# Patient Record
Sex: FEMALE | Race: BLACK OR AFRICAN AMERICAN | NOT HISPANIC OR LATINO | Employment: FULL TIME | URBAN - METROPOLITAN AREA
[De-identification: names, ages, dates, MRNs, and addresses within clinical notes are randomized per-mention and may not be internally consistent; named-entity substitution may affect disease eponyms.]

---

## 2017-09-26 ENCOUNTER — OFFICE VISIT (OUTPATIENT)
Dept: OBSTETRICS AND GYNECOLOGY | Age: 32
End: 2017-09-26

## 2017-09-26 VITALS
HEIGHT: 64 IN | DIASTOLIC BLOOD PRESSURE: 70 MMHG | SYSTOLIC BLOOD PRESSURE: 112 MMHG | BODY MASS INDEX: 23.39 KG/M2 | WEIGHT: 137 LBS

## 2017-09-26 DIAGNOSIS — Z11.51 SCREENING FOR HUMAN PAPILLOMAVIRUS: ICD-10-CM

## 2017-09-26 DIAGNOSIS — Z12.4 ROUTINE CERVICAL SMEAR: Primary | ICD-10-CM

## 2017-09-26 PROBLEM — Z97.5 NEXPLANON IN PLACE: Status: ACTIVE | Noted: 2017-09-26

## 2017-09-26 PROCEDURE — 99395 PREV VISIT EST AGE 18-39: CPT | Performed by: OBSTETRICS & GYNECOLOGY

## 2017-09-26 NOTE — PROGRESS NOTES
Subjective     Chief Complaint   Patient presents with   • Gynecologic Exam     AC       History of Present Illness    Opal Oh is a 32 y.o.  who presents for annual exam. 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  She plans to work for [employer].  She did her medical school training in [place].  She has a daughter who is 2 years old.  Unfortunately she is in the process of getting a divorce.   The patient has a history of open myomectomy due to 2 fibroids one measured 5 cm the other measured 3 cm this was done prior to her delivery.   The patient has the implant.  She would like to change over to an IUD prior to starting her job.  She would like to do this in October.  Her menses are irregular, lasting 0-3 days, dysmenorrhea none   Obstetric History:  OB History      Para Term  AB Living    1 1  1  1    SAB TAB Ectopic Multiple Live Births        1         Menstrual History:     No LMP recorded. Patient has had an implant.         Current contraception: abstinence and Nexplanon   History of abnormal Pap smear: no  Received Gardasil immunization: had one  Perform regular self breast exam: yes  Family history of uterine or ovarian cancer: no  Family History of colon cancer: no  Family history of breast cancer: yes - 3 paternal realatives    Mammogram: not indicated.  Colonoscopy: not indicated.  DEXA: not indicated.    Exercise: not active  Calcium/Vitamin D: adequate intake    The following portions of the patient's history were reviewed and updated as appropriate: allergies, current medications, past family history, past medical history, past social history, past surgical history and problem list.    Review of Systems    Review of Systems   Constitutional: Negative for fatigue.   Respiratory: Negative for shortness of breath.    Gastrointestinal: Negative for abdominal pain.   Genitourinary: Negative for dysuria.   Neurological: Negative for headaches.  "  Psychiatric/Behavioral: Negative for dysphoric mood.         Objective   Physical Exam    /70  Ht 64\" (162.6 cm)  Wt 137 lb (62.1 kg)  BMI 23.52 kg/m2    General:   alert, appears stated age and cooperative   Neck: no asymmetry, masses, or scars and thyroid normal to palpation   Heart: regular rate and rhythm   Lungs: clear to auscultation bilaterally   Abdomen: soft, non-tender, without masses or organomegaly   Breast: inspection negative, no nipple discharge or bleeding, no masses or nodularity palpable   Vulva: normal, Bartholin's, Urethra, [place]'s normal   Vagina: normal mucosa, normal discharge   Cervix: no cervical motion tenderness and Ears a small clear cystic area on the outer part of the cervix at about 10:00.  It is grasped with a hemostat but does not rupture.  It appears clear and benign.   Uterus: mobile, non-tender, normal shape and consistency   Adnexa: no mass, fullness, tenderness   Rectal: not indicated     Assessment/Plan   Opal was seen today for gynecologic exam.    Diagnoses and all orders for this visit:    Routine cervical smear  -     IGP, Aptima HPV, Rfx 16 / 18,45    Screening for human papillomavirus  -     IGP, Aptima HPV, Rfx 16 / 18,45    We had a long discussion about IUDs.  Patient would like the Kyleena.  Discussed risk and benefits.  I think this will inhibit regrowth of any small fibroids.    She is at increased risk for breast cancer due to her family history.  We will send off the my risk test today.  We also discussed that she may be a candidate for annual MRI.  All questions answered.  Breast self exam technique reviewed and patient encouraged to perform self-exam monthly.  Discussed healthy lifestyle modifications.  Recommended 30 minutes of aerobic exercise five times per week.  Discussed calcium needs to prevent osteoporosis.                 "

## 2017-09-29 LAB
CYTOLOGIST CVX/VAG CYTO: NORMAL
CYTOLOGY CVX/VAG DOC THIN PREP: NORMAL
DX ICD CODE: NORMAL
HIV 1 & 2 AB SER-IMP: NORMAL
HPV I/H RISK 4 DNA CVX QL PROBE+SIG AMP: NEGATIVE
OTHER STN SPEC: NORMAL
PATH REPORT.FINAL DX SPEC: NORMAL
STAT OF ADQ CVX/VAG CYTO-IMP: NORMAL

## 2017-10-02 ENCOUNTER — TELEPHONE (OUTPATIENT)
Dept: OBSTETRICS AND GYNECOLOGY | Age: 32
End: 2017-10-02

## 2017-10-06 ENCOUNTER — OFFICE VISIT (OUTPATIENT)
Dept: OBSTETRICS AND GYNECOLOGY | Age: 32
End: 2017-10-06

## 2017-10-06 ENCOUNTER — PROCEDURE VISIT (OUTPATIENT)
Dept: OBSTETRICS AND GYNECOLOGY | Age: 32
End: 2017-10-06

## 2017-10-06 VITALS
DIASTOLIC BLOOD PRESSURE: 60 MMHG | WEIGHT: 137 LBS | HEIGHT: 64 IN | SYSTOLIC BLOOD PRESSURE: 110 MMHG | BODY MASS INDEX: 23.39 KG/M2

## 2017-10-06 DIAGNOSIS — N83.202 OVARIAN CYST, LEFT: ICD-10-CM

## 2017-10-06 DIAGNOSIS — Z30.46 NEXPLANON REMOVAL: ICD-10-CM

## 2017-10-06 DIAGNOSIS — Z30.431 IUD CHECK UP: Primary | ICD-10-CM

## 2017-10-06 DIAGNOSIS — Z30.430 ENCOUNTER FOR IUD INSERTION: Primary | ICD-10-CM

## 2017-10-06 LAB
B-HCG UR QL: NEGATIVE
INTERNAL NEGATIVE CONTROL: NEGATIVE
INTERNAL POSITIVE CONTROL: POSITIVE
Lab: NORMAL

## 2017-10-06 PROCEDURE — 58300 INSERT INTRAUTERINE DEVICE: CPT | Performed by: PHYSICIAN ASSISTANT

## 2017-10-06 PROCEDURE — 81025 URINE PREGNANCY TEST: CPT | Performed by: PHYSICIAN ASSISTANT

## 2017-10-06 PROCEDURE — 11982 REMOVE DRUG IMPLANT DEVICE: CPT | Performed by: PHYSICIAN ASSISTANT

## 2017-10-06 PROCEDURE — 76830 TRANSVAGINAL US NON-OB: CPT | Performed by: PHYSICIAN ASSISTANT

## 2017-10-06 NOTE — PROGRESS NOTES
Nexplanon Removal Procedure Note    Pre-operative Diagnosis: Alternate contraception    Post-operative Diagnosis: same    Indications: contraception    Procedure Details   The risks (including infection, bruising, irregular bleeding, pain at removal site, and injury to muscles, nerves and blood vessels) and benefits of the procedure were explained to the patient and/or guardian and written informed consent was obtained.      Nexplanon device was easily located by palpation of inner arm.  The device insertion site was painted with betadine and allowed to dry.  Device site anesthetized with 3 ml 2% lidocaine with epi.  End of device was located and 11 blade was used to make small incision.  Device was located in subcutaneou tissue, grasped with hemostat and removed intact. Incision site was closed with steri-strips, and band aid.   Pressure dressing applied.  There were no complications.        Condition:  Stable    Complications:  None    Plan:    The patient was advised to call for any rash, arm pain, fever, warmth or for prolonged bruising or bleeding. She was advised to use OTC acetaminophen as needed for mild to moderate pain. IUD Insertion           IUD Insertion    No LMP recorded. Patient has had an implant.    Date of procedure:  10/6/2017    Risks and benefits discussed? yes  All questions answered? yes  Consents given by The patient  Written consent obtained? yes    Procedure documentation:     Urine pregnancy test was done today and result was negative.  The risks (including infection, bleeding, pain, and uterine perforation) and benefits of the procedure were explained to the patient and Written informed consent was obtained.    After verifying the patient had a low probability of being pregnant and met the criteria for insertion, a sterile speculum has placed and the cervix was cleansed with an antiseptic solution.  Vaginal discharge was scant.  The anterior lip of the cervix was grasped with an eddie   and the uterine cavity was gently sounded. There was no difficulty passing the sound through the cervix.  Cervical dilation did not need to be performed prior to placing the IUD.  The uterus was anteverted and sounded to 8 cms.  The Kyleena was then prepared per the manufacturers instructions.    Of note, she does have what appears to be a cyst at the 9 oclock position near her cervix that pushes it to the left.  She states Dr Godwin is aware of this.    The Kyleena was advanced to a point 2 cms from the fundus and then the arms were released from the sheath.  The device was advanced to the fundus and the device was released fully from the sheath.. The string was cut 2 cms in length.  Bleeding from the cervix was scant.    She tolerated the procedure without any difficulty.    Post procedure instructions: The patient was advised to call for any fever or for prolonged or severe pain or bleeding..    Follow up needed: 6 weeks for IUD check    US done and IUD within endometrium.  Left follicle noted that measures 4.3 x 2.4 cm

## 2017-10-20 ENCOUNTER — OFFICE VISIT (OUTPATIENT)
Dept: OBSTETRICS AND GYNECOLOGY | Age: 32
End: 2017-10-20

## 2017-10-20 VITALS
SYSTOLIC BLOOD PRESSURE: 120 MMHG | WEIGHT: 136 LBS | DIASTOLIC BLOOD PRESSURE: 70 MMHG | HEIGHT: 64 IN | BODY MASS INDEX: 23.22 KG/M2

## 2017-10-20 DIAGNOSIS — Z15.09 MONOALLELIC MUTATION OF PALB2 GENE: Primary | ICD-10-CM

## 2017-10-20 DIAGNOSIS — Z15.89 MONOALLELIC MUTATION OF PALB2 GENE: Primary | ICD-10-CM

## 2017-10-20 DIAGNOSIS — Z15.01 MONOALLELIC MUTATION OF PALB2 GENE: Primary | ICD-10-CM

## 2017-10-20 PROBLEM — Z97.5 NEXPLANON IN PLACE: Status: RESOLVED | Noted: 2017-09-26 | Resolved: 2017-10-20

## 2017-10-20 PROBLEM — Z97.5 IUD (INTRAUTERINE DEVICE) IN PLACE: Status: ACTIVE | Noted: 2017-10-20

## 2017-10-20 PROCEDURE — 99215 OFFICE O/P EST HI 40 MIN: CPT | Performed by: OBSTETRICS & GYNECOLOGY

## 2017-10-20 NOTE — PROGRESS NOTES
"  Chief complaint-called in due to positive results for a cancer mutation    History of present illness- Patient is a 32 y.o.  who was tested for genetic mutations due to strong family history of breast cancer in paternal grandmother, paternal aunt and paternal great uncle.  Cancers occurred in their 40s to 50s.  Paternal aunt was BRCA indeterminant.  The patient is unsure if the aunt was tested for PALB 2.  She is positive for heterozygous mutation of the PALB 2 gene.  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  She recently had the kyleena IUD placed last month.  The patient has a 2-year-old daughter.  She is currently in the process of getting a divorce.  She has 1 sibling a brother and her father have never been tested for genetic abnormalities.         /70  Ht 64\" (162.6 cm)  Wt 136 lb (61.7 kg)  BMI 23.34 kg/m2  OBGyn Exam    Constitutional- patient is tearful at the news. She is very appropriate and asked very appropriate questions.    Opal was seen today for follow-up.    Diagnoses and all orders for this visit:    Monoallelic mutation of PALB2 gene  -     Mammo screening digital tomosynthesis bilateral w CAD; Future  -     Ambulatory Referral to Genetics  -     MRI Breast Bilateral With & Without Contrast; Future      We discussed pelvic to in detail.  The pelvis to mutation increases her risk of breast cancer to age 72 between 17-58%.  This significantly elevated over the general population risk of about 11%.  Her risk to age 50 is 14%.  We discussed risk management strategies including breast self exam, clinical breast exam, mammogram, MRI, risk reducing surgeries and risk reducing medications.    The patient is also at increased risk for pancreatic cancer and male breast cancer in the family.    We reviewed all the myriad information in detail and patient was given a full packet of information.    The patient will be referred to genetic counseling.  She will have " her father and brother tested.  [place].    At this point the patient would like to proceed with mammogram and MRI alternated every 6 months.  A 3-D mammogram will be ordered for next week and an MRI in 6 months.  She will return for clinical breast exam in 6 months.  We did discuss her IUD.  At this point was starting a new job next week she would like to retain the IUD likely for this year.  We did discuss that progesterone secretion by the IUD could influence breast cancer risk.  She will likely have it removed at some point but she just does not want to proceed with that today.  She is not ready to have risk reducing surgery at this point but will still consider that.  I also offered referral to GI.  Patient will wait on that for now.  She will return to see me in 6 months.    Time spent counseling was 40 minutes with 90% of that spent face-to-face with the patient.    Xu Godwin MD

## 2017-11-29 ENCOUNTER — OFFICE VISIT (OUTPATIENT)
Dept: OBSTETRICS AND GYNECOLOGY | Age: 32
End: 2017-11-29

## 2017-11-29 ENCOUNTER — PROCEDURE VISIT (OUTPATIENT)
Dept: OBSTETRICS AND GYNECOLOGY | Age: 32
End: 2017-11-29

## 2017-11-29 VITALS
BODY MASS INDEX: 22.71 KG/M2 | WEIGHT: 133 LBS | SYSTOLIC BLOOD PRESSURE: 122 MMHG | DIASTOLIC BLOOD PRESSURE: 78 MMHG | HEIGHT: 64 IN

## 2017-11-29 DIAGNOSIS — Z30.431 IUD CHECK UP: Primary | ICD-10-CM

## 2017-11-29 DIAGNOSIS — N83.201 CYST OF RIGHT OVARY: Primary | ICD-10-CM

## 2017-11-29 PROCEDURE — 99212 OFFICE O/P EST SF 10 MIN: CPT | Performed by: PHYSICIAN ASSISTANT

## 2017-11-29 PROCEDURE — 76830 TRANSVAGINAL US NON-OB: CPT | Performed by: PHYSICIAN ASSISTANT

## 2017-11-29 NOTE — PROGRESS NOTES
"Subjective     Chief Complaint   Patient presents with   • Follow-up     iud follow up check       Opal Oh is a 32 y.o.  whose LMP is No LMP recorded. Patient has had an implant. presents for her IUD check. She has been having spotting, but no pain. Denies painful intercourse. Not checking strings but knows she can.  Had a 4 cm follicle noted on left ovary when u/s done to confirm placement of IUD.  Here for recheck on that. Denies pain/pressure    She is a pt of Dr Godwin    No Additional Complaints Reported    The following portions of the patient's history were reviewed and updated as appropriate:vital signs, allergies, current medications, past family history, past medical history, past social history, past surgical history and problem list      Review of Systems   A comprehensive review of systems was negative except for: iud check     Objective      /78  Ht 64\" (162.6 cm)  Wt 133 lb (60.3 kg)  LMP Comment: spotting  Breastfeeding? No  BMI 22.83 kg/m2    Physical Exam    General:   alert, comfortable and no distress   Heart: Not performed today   Lungs: Not performed today.   Breast: Not performed today   Neck: na   Abdomen: {Not performed today   CVA: Not performed today   Pelvis: Cervix: IUD string visualized and cyst noted, stable at the 9 o'clock position   Extremities: Not performed today   Neurologic: negative   Psychiatric: Normal affect, judgement, and mood       Lab Review   Labs: No data reviewed     Imaging   Ultrasound - Pelvic Vaginal  U/s done and shows left follicle resolved. Small follicle noted on the right that measures 18.0 x 14.0.  IUD in cavity    Assessment/Plan     ASSESSMENT  1. IUD check up        PLAN  1. IUD in place. Pt has no c/o. F/u for annual or prn     Follow up: 1 year(s)    NIRAJ Solomon  2017           "

## 2018-05-16 ENCOUNTER — OFFICE VISIT (OUTPATIENT)
Dept: OBSTETRICS AND GYNECOLOGY | Age: 33
End: 2018-05-16

## 2018-05-16 VITALS
BODY MASS INDEX: 22.53 KG/M2 | HEIGHT: 64 IN | SYSTOLIC BLOOD PRESSURE: 100 MMHG | DIASTOLIC BLOOD PRESSURE: 64 MMHG | WEIGHT: 132 LBS

## 2018-05-16 DIAGNOSIS — Z15.09 MONOALLELIC MUTATION OF PALB2 GENE: ICD-10-CM

## 2018-05-16 DIAGNOSIS — Z15.89 MONOALLELIC MUTATION OF PALB2 GENE: ICD-10-CM

## 2018-05-16 DIAGNOSIS — Z15.01 MONOALLELIC MUTATION OF PALB2 GENE: ICD-10-CM

## 2018-05-16 DIAGNOSIS — Z01.419 ENCOUNTER FOR GYNECOLOGICAL EXAMINATION: Primary | ICD-10-CM

## 2018-05-16 LAB
BILIRUB BLD-MCNC: NEGATIVE MG/DL
CLARITY, POC: CLEAR
COLOR UR: YELLOW
GLUCOSE UR STRIP-MCNC: NEGATIVE MG/DL
KETONES UR QL: NEGATIVE
LEUKOCYTE EST, POC: NEGATIVE
NITRITE UR-MCNC: NEGATIVE MG/ML
PH UR: 5.5 [PH] (ref 5–8)
PROT UR STRIP-MCNC: NEGATIVE MG/DL
RBC # UR STRIP: NEGATIVE /UL
SP GR UR: 1.02 (ref 1–1.03)
UROBILINOGEN UR QL: NORMAL

## 2018-05-16 PROCEDURE — 99212 OFFICE O/P EST SF 10 MIN: CPT | Performed by: OBSTETRICS & GYNECOLOGY

## 2018-05-16 PROCEDURE — 81002 URINALYSIS NONAUTO W/O SCOPE: CPT | Performed by: OBSTETRICS & GYNECOLOGY

## 2018-05-16 NOTE — PROGRESS NOTES
"  Chief complaint-    History of present illness- Patient is a 33 y.o.  who is here for follow-up.  Patient was diagnosed with the PA LB2 genetic mutation which puts her at increased risk for breast and pancreatic cancer.  She was just starting practice as an internist.  Her practice is going well.  She did have 3-dimensional mammogram which was normal in October.  I recommended a breast MRI.  MRI was ordered but patient is not ready to do that.  She is very busy with her daughter and she is taking her course for her boards.  She plans to schedule the MRI in the future.  Her Father was tested and was positive.  Her brother or other relatives have not been tested yet.        /64   Ht 162.6 cm (64.02\")   Wt 59.9 kg (132 lb)   BMI 22.65 kg/m²   Physical Exam   Constitutional: She appears well-developed and well-nourished.   Psychiatric: She has a normal mood and affect. Her behavior is normal. Judgment and thought content normal.           Opal was seen today for gynecologic exam.    Diagnoses and all orders for this visit:    Encounter for gynecological examination  -     POC Urinalysis Dipstick    Pt will follow up for her annual exam in the fall.  She will repeat her 3-D mammogram.  She may get her MRI in the meantime.  She declines referral to genetic counselor or the gastroenterologist.  I encouraged her to have her other relatives tested.  She does want to continue with her IUD for now.  "

## 2018-10-16 DIAGNOSIS — Z92.89 H/O MAMMOGRAM: Primary | ICD-10-CM

## 2019-04-19 ENCOUNTER — OFFICE VISIT (OUTPATIENT)
Dept: OBSTETRICS AND GYNECOLOGY | Age: 34
End: 2019-04-19

## 2019-04-19 ENCOUNTER — PROCEDURE VISIT (OUTPATIENT)
Dept: OBSTETRICS AND GYNECOLOGY | Age: 34
End: 2019-04-19

## 2019-04-19 VITALS
DIASTOLIC BLOOD PRESSURE: 74 MMHG | HEIGHT: 64 IN | SYSTOLIC BLOOD PRESSURE: 108 MMHG | WEIGHT: 145 LBS | BODY MASS INDEX: 24.75 KG/M2

## 2019-04-19 DIAGNOSIS — Z11.51 SPECIAL SCREENING EXAMINATION FOR HUMAN PAPILLOMAVIRUS (HPV): ICD-10-CM

## 2019-04-19 DIAGNOSIS — Z97.5 IUD (INTRAUTERINE DEVICE) IN PLACE: ICD-10-CM

## 2019-04-19 DIAGNOSIS — Z01.419 ENCOUNTER FOR GYNECOLOGICAL EXAMINATION WITHOUT ABNORMAL FINDING: Primary | ICD-10-CM

## 2019-04-19 DIAGNOSIS — Z12.4 ROUTINE CERVICAL SMEAR: ICD-10-CM

## 2019-04-19 DIAGNOSIS — Z15.01 MONOALLELIC MUTATION OF PALB2 GENE: ICD-10-CM

## 2019-04-19 DIAGNOSIS — Z30.431 IUD CHECK UP: Primary | ICD-10-CM

## 2019-04-19 DIAGNOSIS — Z15.89 MONOALLELIC MUTATION OF PALB2 GENE: ICD-10-CM

## 2019-04-19 DIAGNOSIS — Z15.09 MONOALLELIC MUTATION OF PALB2 GENE: ICD-10-CM

## 2019-04-19 PROBLEM — T83.32XA IUD STRINGS LOST: Status: ACTIVE | Noted: 2019-04-19

## 2019-04-19 PROCEDURE — 99395 PREV VISIT EST AGE 18-39: CPT | Performed by: OBSTETRICS & GYNECOLOGY

## 2019-04-19 PROCEDURE — 76830 TRANSVAGINAL US NON-OB: CPT | Performed by: OBSTETRICS & GYNECOLOGY

## 2019-04-19 RX ORDER — MONTELUKAST SODIUM 10 MG/1
10 TABLET ORAL NIGHTLY
COMMUNITY

## 2019-04-19 RX ORDER — ESCITALOPRAM OXALATE 10 MG/1
10 TABLET ORAL DAILY
COMMUNITY
End: 2021-09-24

## 2019-04-19 RX ORDER — AZELASTINE HYDROCHLORIDE, FLUTICASONE PROPIONATE 137; 50 UG/1; UG/1
SPRAY, METERED NASAL
COMMUNITY

## 2019-04-19 RX ORDER — CETIRIZINE HYDROCHLORIDE 10 MG/1
TABLET ORAL
Refills: 11 | COMMUNITY
Start: 2019-04-10

## 2019-04-25 ENCOUNTER — TELEPHONE (OUTPATIENT)
Dept: OBSTETRICS AND GYNECOLOGY | Age: 34
End: 2019-04-25

## 2019-04-25 NOTE — TELEPHONE ENCOUNTER
----- Message from Xu Godwin MD sent at 4/25/2019  8:55 AM EDT -----  Please notify pap is normal.

## 2019-09-18 ENCOUNTER — TELEPHONE (OUTPATIENT)
Dept: OBSTETRICS AND GYNECOLOGY | Age: 34
End: 2019-09-18

## 2019-11-01 ENCOUNTER — TELEPHONE (OUTPATIENT)
Dept: OBSTETRICS AND GYNECOLOGY | Age: 34
End: 2019-11-01

## 2019-11-01 NOTE — TELEPHONE ENCOUNTER
Spoke with pt regarding her breast MRI,  she had a change of insurance and would like an order sent to [facility].

## 2020-02-06 ENCOUNTER — TELEPHONE (OUTPATIENT)
Dept: OBSTETRICS AND GYNECOLOGY | Age: 35
End: 2020-02-06

## 2020-02-06 DIAGNOSIS — Z15.89 MONOALLELIC MUTATION OF PALB2 GENE: Primary | ICD-10-CM

## 2020-02-06 DIAGNOSIS — Z15.01 MONOALLELIC MUTATION OF PALB2 GENE: Primary | ICD-10-CM

## 2020-02-06 DIAGNOSIS — Z15.09 MONOALLELIC MUTATION OF PALB2 GENE: Primary | ICD-10-CM

## 2020-02-06 NOTE — TELEPHONE ENCOUNTER
Ok, since the pt is scheduled for both 3D mammo and MRI, do you want her to keep her 3D mammo 2/14 and the MRI can be pushed out in August or keep the MRI 4/17 and push 3D mammo out in October?

## 2020-02-06 NOTE — TELEPHONE ENCOUNTER
Pt screening 3D Mammo and MRI Breast Bilateral orders have been faxed to [facility]. Will schedule pt and give her a call.

## 2020-02-06 NOTE — TELEPHONE ENCOUNTER
Patient request order for mammogram.  Would like scheduled at [facility], Friday afternoons.

## 2020-02-06 NOTE — TELEPHONE ENCOUNTER
Dr Godwin, your pt is scheduled for her 3D bilateral mammo 2/14/2020. She is also scheduled for her MRI 4/17/2020. Since the pt has never had a MRI in the past, should she have it the same day as her mammo? Or do you want her to space it out every 6 months?

## 2020-02-06 NOTE — TELEPHONE ENCOUNTER
Informed pt to call us back regarding her MRI being pushed out into August (6 months after her scheduled 3D mammo Feb 14th). She can let us know if that works better for her.

## 2020-02-06 NOTE — TELEPHONE ENCOUNTER
If they are already scheduled just keep them that way and we can potentially modify them in the future.

## 2020-02-06 NOTE — TELEPHONE ENCOUNTER
Please notify I ordered the mammogram three-dimensional for [facility] and the MRI which is due in April due to her high risk breast cancer mutation.

## 2020-02-12 NOTE — TELEPHONE ENCOUNTER
PHI Pt confirmed her 3D Mammo 2/14.    LVM for pt to call and confirm her MRI 6 months later scheduled Friday 08/14 1:00 arrival to register & 1:30 MRI test @ [facility]. Pt was wanting the [facility] Location, but that location does not perform MRIs; only [facility] location.

## 2020-05-19 ENCOUNTER — TELEPHONE (OUTPATIENT)
Dept: OBSTETRICS AND GYNECOLOGY | Age: 35
End: 2020-05-19

## 2020-05-19 NOTE — TELEPHONE ENCOUNTER
(Citlali pt) Pt is wanting to come in for an IUD check. PT had her iud placed October 2017 and for the past two months she has had bright/dark red spotting with the iud. During the spotting she will have Menstrual like cramps and she admits to having discomfort during intercourse. Please advise. Ok to LIANE.     [phone]

## 2020-05-22 ENCOUNTER — TELEPHONE (OUTPATIENT)
Dept: OBSTETRICS AND GYNECOLOGY | Age: 35
End: 2020-05-22

## 2020-05-22 ENCOUNTER — PROCEDURE VISIT (OUTPATIENT)
Dept: OBSTETRICS AND GYNECOLOGY | Age: 35
End: 2020-05-22

## 2020-05-22 ENCOUNTER — OFFICE VISIT (OUTPATIENT)
Dept: OBSTETRICS AND GYNECOLOGY | Age: 35
End: 2020-05-22

## 2020-05-22 VITALS
BODY MASS INDEX: 24.24 KG/M2 | SYSTOLIC BLOOD PRESSURE: 100 MMHG | WEIGHT: 142 LBS | DIASTOLIC BLOOD PRESSURE: 64 MMHG | HEIGHT: 64 IN

## 2020-05-22 DIAGNOSIS — Z15.89 MONOALLELIC MUTATION OF PALB2 GENE: ICD-10-CM

## 2020-05-22 DIAGNOSIS — Z15.09 MONOALLELIC MUTATION OF PALB2 GENE: ICD-10-CM

## 2020-05-22 DIAGNOSIS — Z20.2 EXPOSURE TO STD: ICD-10-CM

## 2020-05-22 DIAGNOSIS — Z97.5 IUD (INTRAUTERINE DEVICE) IN PLACE: ICD-10-CM

## 2020-05-22 DIAGNOSIS — Z15.01 MONOALLELIC MUTATION OF PALB2 GENE: ICD-10-CM

## 2020-05-22 DIAGNOSIS — R10.2 PELVIC PAIN: ICD-10-CM

## 2020-05-22 DIAGNOSIS — Z30.431 IUD CHECK UP: Primary | ICD-10-CM

## 2020-05-22 DIAGNOSIS — R10.2 PELVIC PAIN: Primary | ICD-10-CM

## 2020-05-22 DIAGNOSIS — R30.0 DYSURIA: ICD-10-CM

## 2020-05-22 PROCEDURE — 99213 OFFICE O/P EST LOW 20 MIN: CPT | Performed by: OBSTETRICS & GYNECOLOGY

## 2020-05-22 PROCEDURE — 76830 TRANSVAGINAL US NON-OB: CPT | Performed by: OBSTETRICS & GYNECOLOGY

## 2020-05-22 RX ORDER — AMOXICILLIN AND CLAVULANATE POTASSIUM 875; 125 MG/1; MG/1
TABLET, FILM COATED ORAL
COMMUNITY
Start: 2020-04-15 | End: 2021-09-24

## 2020-05-22 RX ORDER — CHLORHEXIDINE GLUCONATE 0.12 MG/ML
RINSE ORAL
COMMUNITY
Start: 2020-04-15 | End: 2021-09-24

## 2020-05-22 RX ORDER — CYANOCOBALAMIN 1000 UG/ML
INJECTION, SOLUTION INTRAMUSCULAR; SUBCUTANEOUS
COMMUNITY
Start: 2020-04-06 | End: 2021-09-24

## 2020-05-22 RX ORDER — NITROFURANTOIN 25; 75 MG/1; MG/1
100 CAPSULE ORAL EVERY 12 HOURS
COMMUNITY
Start: 2020-04-15 | End: 2020-05-22

## 2020-05-22 RX ORDER — NITROFURANTOIN 25; 75 MG/1; MG/1
100 CAPSULE ORAL 2 TIMES DAILY
Qty: 14 CAPSULE | Refills: 0 | Status: SHIPPED | OUTPATIENT
Start: 2020-05-22 | End: 2020-05-29

## 2020-05-22 RX ORDER — FLUCONAZOLE 150 MG/1
TABLET ORAL
COMMUNITY
Start: 2020-04-15 | End: 2021-09-24

## 2020-05-22 RX ORDER — VILAZODONE HYDROCHLORIDE 10 MG-20MG
KIT ORAL SEE ADMIN INSTRUCTIONS
COMMUNITY
Start: 2020-04-02 | End: 2021-09-24

## 2020-05-22 NOTE — PROGRESS NOTES
"  Chief complaint-recurrence of her menses with the IUD and some pain with intercourse    History of present illness- Patient is a 35 y.o.  who complains of recurrence of her menses for the past 3 months.  Patient had her Kyleena IUD placed in 2017.  She was amenorrheic but her menses have recurred.  She also has had some discomfort with intercourse.  She was concerned that she may have a cyst or that the IUD may have moved.  She is here today for ultrasound.    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  This increases her risk for breast cancer up to lifetime risk of 58%.  She has not done the MRI but is now willing to do that.  She did complete her mammogram at [employer] in February and it was read as normal.  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  She reports that her father did test positive for the gene her brother has declined testing.  Patient has declined preventative mastectomy at this time.  She may consider after age 39.      Patient went through a divorce and is now with a new partner.  Her daughter is doing well and is 5 years old     Patient reports she is having some recurrent UTIs.  She notes that they do occur around the time of intercourse.  She is trying to empty her bladder before and after intercourse.        /64   Ht 162.6 cm (64\")   Wt 64.4 kg (142 lb)   LMP 2020 (Approximate)   Breastfeeding No   BMI 24.37 kg/m²   Physical Exam   Constitutional: She appears well-developed and well-nourished. No distress.   Genitourinary:   Genitourinary Comments: External genitalia appear normal.  On sterile speculum exam the cervix is visualized.  IUD string is seen.  Pap smear is collected and will also be tested for gonorrhea and chlamydia.  There is no abnormal vaginal discharge.  On bimanual exam there is no cervical motion tenderness.  Uterus is mid position normal size with no tenderness.  There are no adnexal masses.   Abdominal: Soft. She exhibits " no distension. There is no tenderness. There is no guarding.   Psychiatric: She has a normal mood and affect. Her behavior is normal. Thought content normal.       Pelvic ultrasound shows the uterus measures 7 x 4 x 3 cm.  There are no fibroids seen.  IUD appears to be in proper position.  The left ovary measures 2.4 x 1.5 x 1.4 cm.  The right ovary measures 2.3 x 1.7 x 1.9 cm.  There are no cyst or free fluid noted.    Urine dip is positive for trace blood and trace leukocytes    Opal was seen today for gynecologic exam.    Diagnoses and all orders for this visit:    Pelvic pain  -     HCV Antibody With / Rflx To Verification  -     Hepatitis B Surface Antigen  -     HIV-1 / O / 2 Ag / Antibody 4th Generation  -     T. Pallidum (Syphilis) Screening Cascade  -     IGP,CtNgTv,rfx Aptima HPV ASCU    Monoallelic mutation of PALB2 gene  -     MRI Breast Bilateral With & Without Contrast; Future  -     IGP,CtNgTv,rfx Aptima HPV ASCU    IUD (intrauterine device) in place  -     IGP,CtNgTv,rfx Aptima HPV ASCU    Dysuria    Exposure to STD    Other orders  -     nitrofurantoin, macrocrystal-monohydrate, (MACROBID) 100 MG capsule; Take 1 capsule by mouth 2 (Two) Times a Day for 7 days.    Patient is very reassured by proper placement of the IUD.  She would like to keep the IUD for now.  We did do Pap smear and Chlamydia gonorrhea testing today.  She will have blood work done at [facility].  She is having some dysuria symptoms so I will give her a course of Macrobid.  We did discuss possible treatments with Macrobid with intercourse.  Check urine culture.    We discussed the palp 2 gene again.  Patient declines prophylactic surgery.  We will alternate mammograms in February with MRIs in August.  These are ordered.    20 minutes were spent in face-to-face consultation with the patient.

## 2020-05-28 ENCOUNTER — TELEPHONE (OUTPATIENT)
Dept: OBSTETRICS AND GYNECOLOGY | Age: 35
End: 2020-05-28

## 2020-05-28 LAB
C TRACH RRNA CVX QL NAA+PROBE: NEGATIVE
CONV .: NORMAL
CYTOLOGIST CVX/VAG CYTO: NORMAL
CYTOLOGY CVX/VAG DOC CYTO: NORMAL
CYTOLOGY CVX/VAG DOC THIN PREP: NORMAL
DX ICD CODE: NORMAL
HIV 1 & 2 AB SER-IMP: NORMAL
N GONORRHOEA RRNA CVX QL NAA+PROBE: NEGATIVE
OTHER STN SPEC: NORMAL
STAT OF ADQ CVX/VAG CYTO-IMP: NORMAL
T VAGINALIS RRNA SPEC QL NAA+PROBE: NEGATIVE

## 2020-05-28 NOTE — TELEPHONE ENCOUNTER
----- Message from Xu Godwin MD sent at 5/28/2020  8:57 AM EDT -----  Please notify pap is normal.

## 2020-08-13 ENCOUNTER — TELEPHONE (OUTPATIENT)
Dept: OBSTETRICS AND GYNECOLOGY | Age: 35
End: 2020-08-13

## 2020-08-13 NOTE — TELEPHONE ENCOUNTER
(Citlali pt) [facility] called, needs orders for MRI of breast.  Please fax to [phone].

## 2021-06-21 ENCOUNTER — TELEPHONE (OUTPATIENT)
Dept: OBSTETRICS AND GYNECOLOGY | Age: 36
End: 2021-06-21

## 2021-06-21 NOTE — TELEPHONE ENCOUNTER
Left a message with the patient that her my risk test shows that the variant of unknown significance is been now reclassified is no clinical significance.  The palpable gene mutation is unchanged.  I will try to reach the patient again tomorrow to relay this information.

## 2021-06-22 DIAGNOSIS — Z15.09 MONOALLELIC MUTATION OF PALB2 GENE: Primary | ICD-10-CM

## 2021-06-22 DIAGNOSIS — Z15.01 MONOALLELIC MUTATION OF PALB2 GENE: Primary | ICD-10-CM

## 2021-06-22 DIAGNOSIS — Z15.89 MONOALLELIC MUTATION OF PALB2 GENE: Primary | ICD-10-CM

## 2021-06-22 NOTE — TELEPHONE ENCOUNTER
I talked to the patient and she voices understanding.  Patient is overdue for her annual.  She works as a physician.  Please add her on to a Friday afternoon and let her know that time.    I will order her mammogram at [facility].  She request to have her mammograms done there.

## 2021-06-25 ENCOUNTER — TELEPHONE (OUTPATIENT)
Dept: OBSTETRICS AND GYNECOLOGY | Age: 36
End: 2021-06-25

## 2021-06-25 NOTE — TELEPHONE ENCOUNTER
We're also trying to reach patient about her mammo appt but she is not returning our calls.  If she calls back can you have her speak with me or Sharita?  Thanks!

## 2021-06-25 NOTE — TELEPHONE ENCOUNTER
Citlali     Pt stated that she cannot make to appointment to day due to her schedule. Pt is asking for any Friday after 1:00. Please advise which Friday and the time

## 2021-07-12 DIAGNOSIS — Z15.09 MONOALLELIC MUTATION OF PALB2 GENE: Primary | ICD-10-CM

## 2021-07-12 DIAGNOSIS — Z15.89 MONOALLELIC MUTATION OF PALB2 GENE: Primary | ICD-10-CM

## 2021-07-12 DIAGNOSIS — Z15.01 MONOALLELIC MUTATION OF PALB2 GENE: Primary | ICD-10-CM

## 2021-07-13 ENCOUNTER — TELEPHONE (OUTPATIENT)
Dept: OBSTETRICS AND GYNECOLOGY | Age: 36
End: 2021-07-13

## 2021-07-13 NOTE — TELEPHONE ENCOUNTER
----- Message from Xu Godwin MD sent at 7/12/2021  8:00 PM EDT -----  Please notify mammogram is normal but MRI is recommended due to her genetic mutation and dense breasts. I will place order.

## 2021-07-13 NOTE — TELEPHONE ENCOUNTER
Pt chris. 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

## 2021-07-13 NOTE — TELEPHONE ENCOUNTER
"S/w pt  Scheduled Fri 1/14/22 at [facility].       Per  Eli they cannot do Breast MRI at [facility].  Only [facility] or [facility]. Pt is ok with this.  \"Oh yeah, that's right\"    Updated insurance, Yusra [id number]    "

## 2021-08-20 ENCOUNTER — TELEPHONE (OUTPATIENT)
Dept: OBSTETRICS AND GYNECOLOGY | Age: 36
End: 2021-08-20

## 2021-08-20 NOTE — TELEPHONE ENCOUNTER
Citlali pt    Pt needs to r/s due to being on her cycle. Pt is asking to be scheduled on a Friday after 1:00. Pt stated that she only works half days on Fridays. Please advise    [phone]

## 2021-09-24 ENCOUNTER — OFFICE VISIT (OUTPATIENT)
Dept: OBSTETRICS AND GYNECOLOGY | Age: 36
End: 2021-09-24

## 2021-09-24 VITALS
SYSTOLIC BLOOD PRESSURE: 118 MMHG | HEIGHT: 64 IN | BODY MASS INDEX: 24.75 KG/M2 | WEIGHT: 145 LBS | DIASTOLIC BLOOD PRESSURE: 74 MMHG

## 2021-09-24 DIAGNOSIS — Z01.419 ENCOUNTER FOR GYNECOLOGICAL EXAMINATION WITHOUT ABNORMAL FINDING: ICD-10-CM

## 2021-09-24 DIAGNOSIS — Z11.51 SPECIAL SCREENING EXAMINATION FOR HUMAN PAPILLOMAVIRUS (HPV): ICD-10-CM

## 2021-09-24 DIAGNOSIS — Z20.2 POSSIBLE EXPOSURE TO STD: ICD-10-CM

## 2021-09-24 DIAGNOSIS — Z97.5 IUD (INTRAUTERINE DEVICE) IN PLACE: ICD-10-CM

## 2021-09-24 DIAGNOSIS — Z15.01 MONOALLELIC MUTATION OF PALB2 GENE: ICD-10-CM

## 2021-09-24 DIAGNOSIS — Z12.4 SCREENING FOR MALIGNANT NEOPLASM OF THE CERVIX: Primary | ICD-10-CM

## 2021-09-24 DIAGNOSIS — Z15.09 MONOALLELIC MUTATION OF PALB2 GENE: ICD-10-CM

## 2021-09-24 DIAGNOSIS — Z15.89 MONOALLELIC MUTATION OF PALB2 GENE: ICD-10-CM

## 2021-09-24 PROCEDURE — 99395 PREV VISIT EST AGE 18-39: CPT | Performed by: OBSTETRICS & GYNECOLOGY

## 2021-09-24 RX ORDER — AZELASTINE 1 MG/ML
1 SPRAY, METERED NASAL
COMMUNITY
Start: 2021-05-28 | End: 2021-09-24

## 2021-09-24 RX ORDER — NITROFURANTOIN 25; 75 MG/1; MG/1
100 CAPSULE ORAL
COMMUNITY
Start: 2021-09-23 | End: 2021-09-30

## 2021-09-24 RX ORDER — VALACYCLOVIR HYDROCHLORIDE 500 MG/1
500 TABLET, FILM COATED ORAL 2 TIMES DAILY
COMMUNITY

## 2021-09-24 RX ORDER — ERGOCALCIFEROL 1.25 MG/1
50000 CAPSULE ORAL
COMMUNITY
Start: 2021-06-11 | End: 2021-12-08

## 2021-09-24 RX ORDER — PAROXETINE 10 MG/1
TABLET, FILM COATED ORAL
COMMUNITY
Start: 2021-09-23 | End: 2022-02-18

## 2021-09-24 NOTE — PROGRESS NOTES
Subjective     Chief Complaint   Patient presents with   • Gynecologic Exam     AC, c/o occasional Rt side pain       History of Present Illness    Opal Oh is a 36 y.o.  who presents for annual exam.  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  She has a significant history of a genetic mutation that increases her risk for breast cancer.  She does MRIs and mammograms.  Patient has a daughter was a first grader at [employer].  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  Her grandmother passed away.  Patient has not had an MRI of the breast yet but would like to schedule for about 6 months from her mammogram.  She is declined prophylactic breast surgery.  Her gene mutation increases her risk for breast cancer up to 58% and also increases her risk for pancreatic cancer.  Her menses are regular every 28-30 days, lasting 4-7 days, dysmenorrhea none   Obstetric History:  OB History        1    Para   1    Term           1    AB        Living   1       SAB        TAB        Ectopic        Molar        Multiple        Live Births   1               Menstrual History:     Patient's last menstrual period was 2021.         Current contraception: IUD Kyleena placed 2017.  History of abnormal Pap smear: no  Received Gardasil immunization: yes had 1 or 2   Perform regular self breast exam : yes  Family history of uterine or ovarian cancer: no  Family History of colon cancer: no  Family history of breast cancer: Paternal grandmother 52 and paternal aunt 46.-Patient is a carrier for genetic mutation.    Mammogram: up to date.  2021 at [facility]  Colonoscopy: not indicated.  DEXA: not indicated.    Exercise: beach body   Calcium/Vitamin D: inadequate intake    The following portions of the patient's history were reviewed and updated as appropriate: allergies, current medications, past family  "history, past medical history, past social history, past surgical history and problem list.    Review of Systems    Review of Systems   Constitutional: Negative for fatigue.   Respiratory: Negative for shortness of breath.    Gastrointestinal: Negative for abdominal pain.   Genitourinary: Negative for dysuria.   Neurological: Negative for headaches.   Psychiatric/Behavioral: Negative for dysphoric mood.     Objective   Physical Exam    /74   Ht 162.6 cm (64\")   Wt 65.8 kg (145 lb)   LMP 09/17/2021   Breastfeeding No   BMI 24.89 kg/m²     General:   alert, appears stated age and cooperative   Neck: thyroid normal to palpation   Heart: regular rate and rhythm   Lungs: clear to auscultation bilaterally   Abdomen: soft, non-tender, without masses or organomegaly   Breast: inspection negative, no nipple discharge or bleeding, no masses or nodularity palpable   Vulva: normal, Bartholin's, Urethra, [place]'s normal   Vagina: normal mucosa, normal discharge   Cervix: no cervical motion tenderness and no lesions   Uterus: non-tender, normal shape and consistency   Adnexa: no mass, fullness, tenderness   Rectal: not indicated     Assessment/Plan   Diagnoses and all orders for this visit:    1. Screening for malignant neoplasm of the cervix (Primary)  -     IGP,CtNg,AptimaHPV,rfx16 / 18,45    2. Special screening examination for human papillomavirus (HPV)  -     IGP,CtNg,AptimaHPV,rfx16 / 18,45    3. Possible exposure to STD  -     IGP,CtNg,AptimaHPV,rfx16 / 18,45    4. Monoallelic mutation of PALB2 gene  -     Cancel: MRI Breast Bilateral With & Without Contrast; Future    5. IUD (intrauterine device) in place    6. Encounter for gynecological examination without abnormal finding    Other orders  -     Cancel: RPR, Rfx Qn RPR / Confirm TP  -     Cancel: Hepatitis B Surface Antigen  -     Cancel: Hepatitis C Antibody  -     Cancel: HIV-1 / O / 2 Ag / Antibody 4th Generation  -     Cancel: HSV 1 & 2 - Specific " Antibody, IgG    The patient is high risk for breast cancer due to genetic mutation-we discussed options again including prophylactic surgery, surveillance every 6 months alternating mammograms and MRI and referral to oncology for risk reducing medications.  At this point patient only wants to do the breast surveillance.  She is willing to start the breast MRIs.  Her mammogram was normal at [facility] a couple of months ago.  Order placed for the MRI.  She will let me know if she changes her mind about other strategies.    Patient is happy with her IUD will continue.    HSV-2-patient will continue suppression.  We checked for other STDs today.  Patient could consider completing her Gardasil series since she is only had 2 of the vaccinations.    All questions answered.  Breast self exam technique reviewed and patient encouraged to perform self-exam monthly.  Discussed healthy lifestyle modifications.  Recommended 30 minutes of aerobic exercise five times per week.  Discussed calcium needs to prevent osteoporosis.

## 2021-10-01 LAB
C TRACH RRNA CVX QL NAA+PROBE: NEGATIVE
CYTOLOGIST CVX/VAG CYTO: ABNORMAL
CYTOLOGY CVX/VAG DOC CYTO: ABNORMAL
CYTOLOGY CVX/VAG DOC THIN PREP: ABNORMAL
DX ICD CODE: ABNORMAL
HIV 1 & 2 AB SER-IMP: ABNORMAL
HPV I/H RISK 4 DNA CVX QL PROBE+SIG AMP: POSITIVE
HPV16 DNA CVX QL PROBE+SIG AMP: NEGATIVE
HPV18+45 E6+E7 MRNA CVX QL NAA+PROBE: NEGATIVE
N GONORRHOEA RRNA CVX QL NAA+PROBE: NEGATIVE
OTHER STN SPEC: ABNORMAL
STAT OF ADQ CVX/VAG CYTO-IMP: ABNORMAL

## 2021-10-04 PROBLEM — B97.7 HPV IN FEMALE: Status: ACTIVE | Noted: 2021-10-04

## 2021-10-11 ENCOUNTER — CLINICAL SUPPORT (OUTPATIENT)
Dept: OBSTETRICS AND GYNECOLOGY | Age: 36
End: 2021-10-11

## 2021-10-11 DIAGNOSIS — Z23 NEED FOR HPV VACCINATION: Primary | ICD-10-CM

## 2021-10-11 PROCEDURE — 90471 IMMUNIZATION ADMIN: CPT | Performed by: NURSE PRACTITIONER

## 2021-10-11 PROCEDURE — 90651 9VHPV VACCINE 2/3 DOSE IM: CPT | Performed by: NURSE PRACTITIONER

## 2021-11-12 ENCOUNTER — CLINICAL SUPPORT (OUTPATIENT)
Dept: OBSTETRICS AND GYNECOLOGY | Age: 36
End: 2021-11-12

## 2021-11-12 DIAGNOSIS — Z23 NEED FOR HPV VACCINATION: Primary | ICD-10-CM

## 2021-11-12 PROCEDURE — 90471 IMMUNIZATION ADMIN: CPT | Performed by: NURSE PRACTITIONER

## 2021-11-12 PROCEDURE — 90651 9VHPV VACCINE 2/3 DOSE IM: CPT | Performed by: NURSE PRACTITIONER

## 2022-01-25 ENCOUNTER — TELEPHONE (OUTPATIENT)
Dept: OBSTETRICS AND GYNECOLOGY | Age: 37
End: 2022-01-25

## 2022-01-25 NOTE — TELEPHONE ENCOUNTER
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 Please advise if there is a Friday you could work patient in she stated fridays are her half days.

## 2022-01-28 ENCOUNTER — OFFICE VISIT (OUTPATIENT)
Dept: OBSTETRICS AND GYNECOLOGY | Age: 37
End: 2022-01-28

## 2022-01-28 VITALS
WEIGHT: 148 LBS | HEIGHT: 64 IN | BODY MASS INDEX: 25.27 KG/M2 | SYSTOLIC BLOOD PRESSURE: 120 MMHG | DIASTOLIC BLOOD PRESSURE: 80 MMHG

## 2022-01-28 DIAGNOSIS — Z15.09 MONOALLELIC MUTATION OF PALB2 GENE: ICD-10-CM

## 2022-01-28 DIAGNOSIS — N93.9 ABNORMAL UTERINE BLEEDING (AUB): ICD-10-CM

## 2022-01-28 DIAGNOSIS — Z15.89 MONOALLELIC MUTATION OF PALB2 GENE: ICD-10-CM

## 2022-01-28 DIAGNOSIS — Z15.01 MONOALLELIC MUTATION OF PALB2 GENE: ICD-10-CM

## 2022-01-28 DIAGNOSIS — Z11.3 SCREENING FOR STDS (SEXUALLY TRANSMITTED DISEASES): Primary | ICD-10-CM

## 2022-01-28 PROBLEM — B97.7 HPV IN FEMALE: Status: RESOLVED | Noted: 2021-10-04 | Resolved: 2022-01-28

## 2022-01-28 PROCEDURE — 99213 OFFICE O/P EST LOW 20 MIN: CPT | Performed by: OBSTETRICS & GYNECOLOGY

## 2022-01-28 RX ORDER — ERGOCALCIFEROL 1.25 MG/1
50000 CAPSULE ORAL WEEKLY
COMMUNITY

## 2022-01-28 NOTE — PROGRESS NOTES
"  Chief complaint-prolonged cycles    History of present illness- Patient is a 36 y.o.  who has a Kyleena IUD.  She is in the fifth year of the Kyleena and is noted that her cycles have gradually gotten longer.  Initially she did not have bleeding.  She now has bleeding that last about 14 days of the month.  She would like to switch out the Kyleena now.  She also would like a swab to rule out any STDs.  She is not having any symptoms.  Patient is a pelvic to carrier and is scheduled for her breast MRI today.  She also does her yearly mammograms.     .     /80   Ht 162.6 cm (64\")   Wt 67.1 kg (148 lb)   Breastfeeding No   BMI 25.40 kg/m²   Physical Exam  Constitutional:       General: She is not in acute distress.     Appearance: Normal appearance.   Genitourinary:      Genitourinary Comments: External genitalia appear normal.  Cervix is visualized.  IUD string is seen but it is up in the canal.  Cervical swab is obtained.  No abnormal discharge noted.  On bimanual exam there is no cervical motion tenderness.  Uterus is midline and no masses are palpated.   Neurological:      Mental Status: She is alert.             Diagnoses and all orders for this visit:    1. Screening for STDs (sexually transmitted diseases) (Primary)  -     Chlamydia trachomatis, Neisseria gonorrhoeae, Trichomonas vaginalis, PCR - Swab, Cervix    2. Monoallelic mutation of PALB2 gene    3. Abnormal uterine bleeding (AUB)    Patient is having some irregular bleeding she is near the end of the 5 years with her IUD.  We will try to remove this IUD and replace a new one.  We talked about different options but the patient would like to go with a Kyleena.    Patient is going to have her first breast MRI which is recommended for increased surveillance due to genetic mutation.  Recommend patient continue her yearly mammograms.    Pap smear in September showed positive HPV with normal cells and negative for type 16,18 and 14 5.  We " discussed the importance of doing a repeat Pap in September.

## 2022-01-31 LAB
C TRACH RRNA SPEC QL NAA+PROBE: NEGATIVE
N GONORRHOEA RRNA SPEC QL NAA+PROBE: NEGATIVE
T VAGINALIS DNA SPEC QL NAA+PROBE: NEGATIVE

## 2022-02-02 DIAGNOSIS — Z15.89 MONOALLELIC MUTATION OF PALB2 GENE: Primary | ICD-10-CM

## 2022-02-02 DIAGNOSIS — Z15.09 MONOALLELIC MUTATION OF PALB2 GENE: Primary | ICD-10-CM

## 2022-02-02 DIAGNOSIS — Z15.01 MONOALLELIC MUTATION OF PALB2 GENE: Primary | ICD-10-CM

## 2022-02-18 ENCOUNTER — OFFICE VISIT (OUTPATIENT)
Dept: OBSTETRICS AND GYNECOLOGY | Age: 37
End: 2022-02-18

## 2022-02-18 VITALS
WEIGHT: 149 LBS | BODY MASS INDEX: 25.44 KG/M2 | DIASTOLIC BLOOD PRESSURE: 74 MMHG | HEIGHT: 64 IN | SYSTOLIC BLOOD PRESSURE: 110 MMHG

## 2022-02-18 DIAGNOSIS — Z30.430 ENCOUNTER FOR IUD INSERTION: ICD-10-CM

## 2022-02-18 DIAGNOSIS — Z32.00 ENCOUNTER FOR PREGNANCY TEST, RESULT UNKNOWN: Primary | ICD-10-CM

## 2022-02-18 LAB
B-HCG UR QL: NEGATIVE
EXPIRATION DATE: NORMAL
INTERNAL NEGATIVE CONTROL: NEGATIVE
INTERNAL POSITIVE CONTROL: POSITIVE
Lab: NORMAL

## 2022-02-18 PROCEDURE — 58300 INSERT INTRAUTERINE DEVICE: CPT | Performed by: OBSTETRICS & GYNECOLOGY

## 2022-02-18 PROCEDURE — 58301 REMOVE INTRAUTERINE DEVICE: CPT | Performed by: OBSTETRICS & GYNECOLOGY

## 2022-02-18 PROCEDURE — 81025 URINE PREGNANCY TEST: CPT | Performed by: OBSTETRICS & GYNECOLOGY

## 2022-02-18 NOTE — PROGRESS NOTES
IUD Removal and Immediate Reinsertion    No LMP recorded. Patient has had an implant.    Date of procedure:  2/18/2022    Risks and benefits discussed? yes  All questions answered? yes  Consents given by the patient  Written consent obtained? yes  Reason for removal: Device expiration    Local anesthesia used:  yes - 1.7 cc's of  Meds; anesthesia local: None, 1% lidocaine with epinephrine    Procedure documentation:    A speculum was placed in order to view the cervix.  A tenaculum did need to be placed on the anterior cervical lip.  Cervical dilation did not need to be performed in order to access the string.  The IUD string was not easily seen.  The string was grasped and the IUD was removed without difficulty.  The IUD did not appear to be adherent to the uterine cavity. It was removed intact.    A sterile speculum was replaced and the cervix was cleansed with an antiseptic solution.  Vaginal discharge was scant.  The anterior lip of the cervix was grasped with a tenaculum and the uterine cavity was gently sounded.  There was no difficulty passing the sound through the cervix.  Cervical dilation did not need to be performed prior to placing the IUD.  The uterus was anteverted and sounded to 9 cms.  The Kyleena was then prepared per the manufacturers instructions.    The Kyleena was advanced to a point 2 cms from the fundus and then the arms were released from the sheath.  The device was advanced to the fundus and the device was released fully from the sheath.. The string was cut 3 cms in length.  Bleeding from the cervix was scant.    She tolerated the procedure without any difficulty.     Post procedure instructions: Will take Motrin for any pain.  She will call with fever or pain not controlled with Motrin.  She will return in 4 to 6 weeks for IUD string check.  February 18, 2022

## 2022-03-29 ENCOUNTER — CLINICAL SUPPORT (OUTPATIENT)
Dept: OBSTETRICS AND GYNECOLOGY | Age: 37
End: 2022-03-29

## 2022-03-29 ENCOUNTER — OFFICE VISIT (OUTPATIENT)
Dept: OBSTETRICS AND GYNECOLOGY | Age: 37
End: 2022-03-29

## 2022-03-29 VITALS
WEIGHT: 149 LBS | HEIGHT: 64 IN | BODY MASS INDEX: 25.44 KG/M2 | SYSTOLIC BLOOD PRESSURE: 120 MMHG | DIASTOLIC BLOOD PRESSURE: 76 MMHG

## 2022-03-29 DIAGNOSIS — B97.7 HPV IN FEMALE: ICD-10-CM

## 2022-03-29 DIAGNOSIS — Z15.89 MONOALLELIC MUTATION OF PALB2 GENE: ICD-10-CM

## 2022-03-29 DIAGNOSIS — Z15.01 MONOALLELIC MUTATION OF PALB2 GENE: ICD-10-CM

## 2022-03-29 DIAGNOSIS — Z97.5 IUD (INTRAUTERINE DEVICE) IN PLACE: ICD-10-CM

## 2022-03-29 DIAGNOSIS — Z15.09 MONOALLELIC MUTATION OF PALB2 GENE: ICD-10-CM

## 2022-03-29 DIAGNOSIS — Z23 NEED FOR HPV VACCINATION: Primary | ICD-10-CM

## 2022-03-29 PROBLEM — T83.32XA IUD STRINGS LOST: Status: RESOLVED | Noted: 2019-04-19 | Resolved: 2022-03-29

## 2022-03-29 PROCEDURE — 90651 9VHPV VACCINE 2/3 DOSE IM: CPT | Performed by: OBSTETRICS & GYNECOLOGY

## 2022-03-29 PROCEDURE — 99213 OFFICE O/P EST LOW 20 MIN: CPT | Performed by: OBSTETRICS & GYNECOLOGY

## 2022-03-29 PROCEDURE — 90471 IMMUNIZATION ADMIN: CPT | Performed by: OBSTETRICS & GYNECOLOGY

## 2022-03-29 RX ORDER — LEVONORGESTREL 19.5 MG/1
1 INTRAUTERINE DEVICE INTRAUTERINE ONCE
COMMUNITY

## 2022-03-29 NOTE — PROGRESS NOTES
"  Chief complaint-IUD check, and discuss Pap smear    History of present illness- Patient is a 36 y.o.  who had Kyleena IUD placed about 5 weeks ago.  She has had irregular bleeding but is overall happy with the IUD.  She did complete her breast MRI.  She is under increased surveillance due to mutation that increases her risk for breast cancer.  MRI was completed in January.  She does her mammograms in July.  Mammograms are done at [facility].  Patient's most recent Pap came back with normal cells but positive for high risk HPV.  Type 16,18 and 45 were negative.  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         /76   Ht 162.6 cm (64\")   Wt 67.6 kg (149 lb)   Breastfeeding No   BMI 25.58 kg/m²   Physical Exam  Constitutional:       General: She is not in acute distress.     Appearance: Normal appearance.   Genitourinary:      Genitourinary Comments: External genitalia appear normal.  On sterile speculum exam IUD strings appear normal in length.  No abnormal discharge is noted.  On bimanual exam there is no cervical motion tenderness.   Neurological:      Mental Status: She is alert.   Psychiatric:         Mood and Affect: Mood normal.         Thought Content: Thought content normal.         Diagnoses and all orders for this visit:    1. Need for HPV vaccination (Primary)  -     HPV Vaccine (HPV9)    2. IUD (intrauterine device) in place    3. Monoallelic mutation of PALB2 gene    4. HPV in female    IUD string check appears normal.  We discussed irregular bleeding that is common with IUD.  Patient wishes to continue with IUD.    Patient has completed her HPV vaccination series today.    Palb 2 gene.  Continue alternating every 6 month breast MRI and mammogram.    HPV-patient's cells were normal but she was positive for HPV.  Type 16,18 and 45 were negative.  Current guidelines recommend repeat Pap in 1 year.  Patient is scheduled in September.  "

## 2022-10-21 ENCOUNTER — OFFICE VISIT (OUTPATIENT)
Dept: OBSTETRICS AND GYNECOLOGY | Age: 37
End: 2022-10-21

## 2022-10-21 VITALS
SYSTOLIC BLOOD PRESSURE: 116 MMHG | DIASTOLIC BLOOD PRESSURE: 78 MMHG | WEIGHT: 145 LBS | BODY MASS INDEX: 24.75 KG/M2 | HEIGHT: 64 IN

## 2022-10-21 DIAGNOSIS — Z01.419 ENCOUNTER FOR GYNECOLOGICAL EXAMINATION WITHOUT ABNORMAL FINDING: ICD-10-CM

## 2022-10-21 DIAGNOSIS — Z15.01 MONOALLELIC MUTATION OF PALB2 GENE: Primary | ICD-10-CM

## 2022-10-21 DIAGNOSIS — Z11.51 SPECIAL SCREENING EXAMINATION FOR HUMAN PAPILLOMAVIRUS (HPV): ICD-10-CM

## 2022-10-21 DIAGNOSIS — Z15.09 MONOALLELIC MUTATION OF PALB2 GENE: Primary | ICD-10-CM

## 2022-10-21 DIAGNOSIS — B97.7 HPV IN FEMALE: ICD-10-CM

## 2022-10-21 DIAGNOSIS — Z15.89 MONOALLELIC MUTATION OF PALB2 GENE: Primary | ICD-10-CM

## 2022-10-21 DIAGNOSIS — Z12.4 SCREENING FOR MALIGNANT NEOPLASM OF THE CERVIX: ICD-10-CM

## 2022-10-21 PROCEDURE — 99395 PREV VISIT EST AGE 18-39: CPT | Performed by: OBSTETRICS & GYNECOLOGY

## 2022-10-21 NOTE — PROGRESS NOTES
Subjective     Chief Complaint   Patient presents with   • Gynecologic Exam     AC, Pt will schedule MG at [facility]       History of Present Illness    Opal Oh is a 37 y.o.  who presents for annual exam.  Patient's history is significant for being a PALB 2 carrier.  Patient alternates breast MRI and mammogram every 6 months.  She is overdue for her mammogram and needs an order.  She does not want to do prophylactic surgery at this time.  She has noticed some occasional mild pain on her right pelvis that occurs about once or twice a month for the past year.  Her bleeding is very light with her Kyleena IUD and she would like to keep it.  Her daughter is now 7 years old.  She is coparenting with her ex-.   Her menses are regular every 28-30 days, lasting 0-3 days, dysmenorrhea none   Obstetric History:  OB History        1    Para   1    Term           1    AB        Living   1       SAB        IAB        Ectopic        Molar        Multiple        Live Births   1               Menstrual History:     No LMP recorded. Patient has had an implant.         Current contraception: IUD kyleena 2022   History of abnormal Pap smear: yes - HPV normal cells    Received Gardasil immunization: yes  Perform regular self breast exam : yes  Family history of uterine or ovarian cancer: no  Family History of colon cancer: no  Family history of breast cancer: yes - Grandmother age 52 and paternal aunt age 46.  Also a paternal great grandmother.  Patient's genetic testing is positive.    Mammogram: ordered.  Colonoscopy: not indicated.  DEXA: not indicated.    Exercise: gym   Calcium/Vitamin D: adequate intake and uses supplements    The following portions of the patient's history were reviewed and updated as appropriate: allergies, current medications, past family history, past medical history, past social history, past surgical history and problem list.    Review of Systems        Objective   Physical  "Exam    /78   Ht 162.6 cm (64\")   Wt 65.8 kg (145 lb)   BMI 24.89 kg/m²     General:   alert, appears stated age and cooperative   Neck: thyroid normal to palpation   Heart: regular rate and rhythm   Lungs: clear to auscultation bilaterally   Abdomen: soft, non-tender, without masses or organomegaly   Breast: inspection negative, no nipple discharge or bleeding, no masses or nodularity palpable   Vulva: normal, Bartholin's, Urethra, [place]'s normal   Vagina: normal mucosa, normal discharge   Cervix: no cervical motion tenderness and no lesions   Uterus: non-tender, normal shape and consistency   Adnexa: no mass, fullness, tenderness   Rectal: not indicated     Assessment & Plan   Diagnoses and all orders for this visit:    1. Monoallelic mutation of PALB2 gene (Primary)  -     Mammo screening digital tomosynthesis bilateral w CAD; Future  -     MRI Breast Bilateral With & Without Contrast; Future    2. HPV in female    3. Encounter for gynecological examination without abnormal finding    Patient had an abnormal Pap smear last year with positive HPV only.  Repeat Pap was sent off today    Genetic mutation that increases risk of breast cancer-patient will continue alternating mammogram and breast MRI.  She does not want to do prophylactic surgery at this time.    Right-sided intermittent pelvic pain.  Patient will come back for pelvic ultrasound to evaluate.    All questions answered.  Breast self exam technique reviewed and patient encouraged to perform self-exam monthly.  Discussed healthy lifestyle modifications.  Recommended 30 minutes of aerobic exercise five times per week.  Discussed calcium needs to prevent osteoporosis.                     "

## 2022-10-27 LAB
CYTOLOGIST CVX/VAG CYTO: NORMAL
CYTOLOGY CVX/VAG DOC CYTO: NORMAL
CYTOLOGY CVX/VAG DOC THIN PREP: NORMAL
DX ICD CODE: NORMAL
HIV 1 & 2 AB SER-IMP: NORMAL
HPV GENOTYPE REFLEX: NORMAL
HPV I/H RISK 4 DNA CVX QL PROBE+SIG AMP: NEGATIVE
OTHER STN SPEC: NORMAL
STAT OF ADQ CVX/VAG CYTO-IMP: NORMAL

## 2023-11-01 ENCOUNTER — TELEPHONE (OUTPATIENT)
Dept: OBSTETRICS AND GYNECOLOGY | Age: 38
End: 2023-11-01

## 2023-11-01 NOTE — TELEPHONE ENCOUNTER
"Caller: Opal Lennon    Relationship: Self    Best call back number: [phone] / LVM    What orders are you requesting (i.e. lab or imaging): MAMMO    In what timeframe would the patient need to come in: AFTER 11/9/23    Where will you receive your lab/imaging services: [facility]    Additional notes: PT WOULD LIKE FOR MAMMO ORDERS TO BE FAXED -[phone] MARKED AS \"PRIVATE ATTN: DR LENNON\" - PLEASE LET THE PT KNOW ONCE THE ORDERS HAVE BEEN FAXED    THANK YOU!  "

## 2023-11-02 DIAGNOSIS — Z15.89 MONOALLELIC MUTATION OF PALB2 GENE: Primary | ICD-10-CM

## 2023-11-02 DIAGNOSIS — Z15.01 MONOALLELIC MUTATION OF PALB2 GENE: Primary | ICD-10-CM

## 2023-11-02 DIAGNOSIS — Z15.09 MONOALLELIC MUTATION OF PALB2 GENE: Primary | ICD-10-CM

## 2023-11-02 DIAGNOSIS — Z12.31 SCREENING MAMMOGRAM FOR BREAST CANCER: ICD-10-CM
